# Patient Record
Sex: MALE | Race: WHITE
[De-identification: names, ages, dates, MRNs, and addresses within clinical notes are randomized per-mention and may not be internally consistent; named-entity substitution may affect disease eponyms.]

---

## 2020-02-11 ENCOUNTER — HOSPITAL ENCOUNTER (OUTPATIENT)
Dept: HOSPITAL 75 - RAD | Age: 30
End: 2020-02-11
Attending: INTERNAL MEDICINE
Payer: COMMERCIAL

## 2020-02-11 DIAGNOSIS — R05: ICD-10-CM

## 2020-02-11 DIAGNOSIS — J30.2: Primary | ICD-10-CM

## 2020-02-11 PROCEDURE — 36415 COLL VENOUS BLD VENIPUNCTURE: CPT

## 2020-02-11 PROCEDURE — 71046 X-RAY EXAM CHEST 2 VIEWS: CPT

## 2020-02-11 PROCEDURE — 86003 ALLG SPEC IGE CRUDE XTRC EA: CPT

## 2020-02-11 NOTE — DIAGNOSTIC IMAGING REPORT
INDICATION: Cough and congestion.



PA and lateral views of the chest were obtained.



FINDINGS: The heart size, mediastinal configuration, and

pulmonary vascularity are within normal limits. There is no

pleural effusion, pneumothorax, or pneumonia. The osseous

structures are unremarkable. 



IMPRESSION: No acute cardiopulmonary abnormality.



Dictated by: 



  Dictated on workstation # [id number]

## 2020-02-12 LAB
A ALTERNATA IGE QN: <0.35 KU/L (ref ?–0.35)
RHEUMATOID FACT SERPL-ACNC: <0.35 KU/L (ref ?–0.35)

## 2020-02-14 ENCOUNTER — HOSPITAL ENCOUNTER (OUTPATIENT)
Dept: HOSPITAL 75 - PREOP | Age: 30
Discharge: HOME | End: 2020-02-14
Attending: INTERNAL MEDICINE
Payer: COMMERCIAL

## 2020-02-14 VITALS — WEIGHT: 88.18 LBS | BODY MASS INDEX: 11.32 KG/M2 | HEIGHT: 74.02 IN

## 2020-02-14 DIAGNOSIS — Z01.818: Primary | ICD-10-CM

## 2020-02-17 ENCOUNTER — HOSPITAL ENCOUNTER (OUTPATIENT)
Dept: HOSPITAL 75 - RT | Age: 30
End: 2020-02-17
Attending: INTERNAL MEDICINE
Payer: COMMERCIAL

## 2020-02-17 DIAGNOSIS — J30.2: Primary | ICD-10-CM

## 2020-02-17 PROCEDURE — 94060 EVALUATION OF WHEEZING: CPT

## 2020-02-17 PROCEDURE — 94729 DIFFUSING CAPACITY: CPT

## 2020-02-17 PROCEDURE — 94726 PLETHYSMOGRAPHY LUNG VOLUMES: CPT

## 2020-03-05 ENCOUNTER — HOSPITAL ENCOUNTER (OUTPATIENT)
Dept: HOSPITAL 75 - PREOP | Age: 30
Discharge: HOME | End: 2020-03-05
Attending: SURGERY
Payer: COMMERCIAL

## 2020-03-05 VITALS — WEIGHT: 183.42 LBS | HEIGHT: 74.02 IN | BODY MASS INDEX: 23.54 KG/M2

## 2020-03-05 DIAGNOSIS — Z01.818: Primary | ICD-10-CM

## 2020-03-11 ENCOUNTER — HOSPITAL ENCOUNTER (OUTPATIENT)
Dept: HOSPITAL 75 - RAD | Age: 30
End: 2020-03-11
Attending: NURSE PRACTITIONER
Payer: COMMERCIAL

## 2020-03-11 DIAGNOSIS — J45.909: Primary | ICD-10-CM

## 2020-03-11 DIAGNOSIS — R13.10: ICD-10-CM

## 2020-03-11 DIAGNOSIS — R04.2: ICD-10-CM

## 2020-03-11 LAB
BUN/CREAT SERPL: 15
CREAT SERPL-MCNC: 1.06 MG/DL (ref 0.6–1.3)
GFR SERPLBLD BASED ON 1.73 SQ M-ARVRAT: > 60 ML/MIN

## 2020-03-11 PROCEDURE — 71260 CT THORAX DX C+: CPT

## 2020-03-11 PROCEDURE — 82565 ASSAY OF CREATININE: CPT

## 2020-03-11 PROCEDURE — 36415 COLL VENOUS BLD VENIPUNCTURE: CPT

## 2020-03-11 PROCEDURE — 84520 ASSAY OF UREA NITROGEN: CPT

## 2020-03-14 NOTE — DIAGNOSTIC IMAGING REPORT
PROCEDURE: CT chest with contrast only.



TECHNIQUE: Multiple contiguous axial images were obtained through

the chest after administration of intravenous contrast. Auto

Exposure Controls were utilized during the CT exam to meet ALARA

standards for radiation dose reduction. 



INDICATION: Cough, hemoptysis



There are no prior CT chest examinations available for

comparison. The plain film examination of the chest performed on

02/20/2020 was unremarkable for an acute abnormality.



On this exam, the pulmonary arteries are not well opacified and

consequently difficult to assess for pulmonary embolus. There is

no definite defect to suggest a pulmonary embolus. The aorta is

not abnormally dilated and there is no evidence for dissection.

The lungs are generally clear. There is no sign of failure,

pneumonia or a pleural effusion to suggest an acute abnormality.



There is no mediastinal or hilar adenopathy. The thyroid gland is

generally unremarkable.



The sections through the upper abdomen failed to show any sign of

an acute abnormality. The bone windows are unremarkable for a

fracture or for a destructive lesion.



IMPRESSION:

1. There is no evidence for an acute cardiopulmonary abnormality.

2. The pulmonary arteries were not well opacified and

consequently difficult to assess for pulmonary embolus. However,

there is no definite defect to suggest a pulmonary embolus. The

aorta is unremarkable.



Dictated by: 



  Dictated on workstation # [id number]